# Patient Record
Sex: FEMALE | Race: WHITE | NOT HISPANIC OR LATINO | Employment: OTHER | URBAN - NONMETROPOLITAN AREA
[De-identification: names, ages, dates, MRNs, and addresses within clinical notes are randomized per-mention and may not be internally consistent; named-entity substitution may affect disease eponyms.]

---

## 2017-11-08 ENCOUNTER — TRANSCRIBE ORDERS (OUTPATIENT)
Dept: ADMINISTRATIVE | Facility: HOSPITAL | Age: 57
End: 2017-11-08

## 2017-11-08 DIAGNOSIS — Z12.31 VISIT FOR SCREENING MAMMOGRAM: Primary | ICD-10-CM

## 2017-11-15 ENCOUNTER — OFFICE VISIT (OUTPATIENT)
Dept: CARDIOLOGY | Facility: CLINIC | Age: 57
End: 2017-11-15

## 2017-11-15 VITALS
WEIGHT: 142.4 LBS | BODY MASS INDEX: 24.31 KG/M2 | DIASTOLIC BLOOD PRESSURE: 77 MMHG | HEART RATE: 68 BPM | HEIGHT: 64 IN | SYSTOLIC BLOOD PRESSURE: 151 MMHG

## 2017-11-15 DIAGNOSIS — E78.5 DYSLIPIDEMIA: ICD-10-CM

## 2017-11-15 DIAGNOSIS — R00.2 PALPITATIONS: Primary | ICD-10-CM

## 2017-11-15 DIAGNOSIS — I10 ESSENTIAL HYPERTENSION: ICD-10-CM

## 2017-11-15 PROCEDURE — 90471 IMMUNIZATION ADMIN: CPT | Performed by: INTERNAL MEDICINE

## 2017-11-15 PROCEDURE — 99214 OFFICE O/P EST MOD 30 MIN: CPT | Performed by: INTERNAL MEDICINE

## 2017-11-15 PROCEDURE — 90686 IIV4 VACC NO PRSV 0.5 ML IM: CPT | Performed by: INTERNAL MEDICINE

## 2017-11-15 NOTE — PROGRESS NOTES
Subjective:     Encounter Date:11/15/2017    Patient ID: Greta Wheeler is a 57 y.o.  white female, retired  for [employer], from [place], [place].      REFERRING/PRIMARY CARE PHYSICIAN: Cristian Muñoz MD    Chief Complaint:   Chief Complaint   Patient presents with   • Palpitations     Problem List:  1. Tachypalpitations:   a. Recent symptoms of CCS class I/NYHA class I and palpitations with tachycardia for approximately 1 month.   b. Abnormal EKG demonstrating sinus tachycardia with diffuse nonspecific ST-T changes; ventricular rate of 113 BPM, 2013.   c. Event recorder, 2013-10/17/2013, showing intermittent rapid sinus tachycardia.  d. Transthoracic echocardiogram, 2013, showing an estimated EF of 60% to 65%.   e. Residual class I symptoms.  2. Hypertension.   3. Mild dyslipidemia.   4. Diverticulitis/diverticulosis.   5. Remote tobacco use, quit in .   6. Osteoporosis.   7. Surgical history:  a. Benign lumpectomy, .   b. , .   c. Brain aneurysm repair, .   d. Cholecystectomy, .      Allergies   Allergen Reactions   • Sulfa Antibiotics Hives         Current Outpatient Prescriptions:   •  alendronate (FOSAMAX) 70 MG tablet, 1 (One) Time Per Week., Disp: , Rfl: 3  •  atorvastatin (LIPITOR) 10 MG tablet, Daily., Disp: , Rfl: 3  •  lisinopril-hydrochlorothiazide (PRINZIDE,ZESTORETIC) 10-12.5 MG per tablet, Take  by mouth Daily., Disp: , Rfl:   •  meloxicam (MOBIC) 15 MG tablet, occasional, Disp: , Rfl: 3  •  nebivolol (BYSTOLIC) 2.5 MG tablet, Take 1 tablet by mouth Daily., Disp: 90 tablet, Rfl: 3  •  pantoprazole (PROTONIX) 40 MG EC tablet, Take  by mouth Daily., Disp: , Rfl:     HISTORY OF PRESENT ILLNESS: Patient returns for scheduled annual followup. She denies any palpitations, presyncope, syncope, edema, chest pressure, or shortness of breath.  She just celebrated her birthday and was able to spend the day with her granddaughter,  "which she enjoyed.  She had laboratory studies since she was last here, and she will have her physician send these results to us.  She is requesting a flu vaccination today in office.  She had an episode recently where she had sharp right upper quadrant pain with normal laboratory values.  She states that this sharp pain lasted for around a week and then subsided.  She denies any nausea, vomiting, fevers, chills, or gallbladder ultrasound.  She has already had a cholecystectomy.  She is requesting her next followup appointment be the same day as her 's next fall.  Patient otherwise denies chest pain, shortness of breath, PND, edema, palpitations, syncope or presyncope at this time.        Review of Systems   Respiratory: Positive for cough.       Obtained and otherwise negative except as outlined in problem list and HPI.    Procedures       Objective:       Vitals:    11/15/17 1106 11/15/17 1107   BP: 136/77 151/77   BP Location: Right arm Right arm   Patient Position: Sitting Standing   Pulse: 64 68   Weight: 142 lb 6.4 oz (64.6 kg)    Height: 64\" (162.6 cm)    Recheck blood pressure right arm sitting was 130/70  Body mass index is 24.44 kg/(m^2).   Last weight:  141 lbs.    Physical Exam   Constitutional: She is oriented to person, place, and time. She appears well-developed and well-nourished.   Neck: No JVD present. Carotid bruit is not present. No thyromegaly present.   Cardiovascular: Regular rhythm, S1 normal, S2 normal and normal heart sounds.  Exam reveals no gallop, no S3 and no friction rub.    No murmur heard.  Pulses:       Dorsalis pedis pulses are 2+ on the right side, and 2+ on the left side.        Posterior tibial pulses are 2+ on the right side, and 2+ on the left side.   Pulmonary/Chest: Effort normal and breath sounds normal. She has no wheezes. She has no rhonchi. She has no rales.   Abdominal: Soft. She exhibits no mass. There is no hepatosplenomegaly. There is no tenderness. There is " no guarding.   Bowel sounds audible x4   Musculoskeletal: Normal range of motion. She exhibits no edema.   Lymphadenopathy:     She has no cervical adenopathy.   Neurological: She is alert and oriented to person, place, and time.   Skin: Skin is warm, dry and intact. No rash noted.   Vitals reviewed.        Lab Review: No recent laboratory results available for review today.          Assessment:   Overall continued acceptable course with no interim cardiopulmonary complaints with good functional status. We will defer additional diagnostic or therapeutic intervention from a cardiac perspective at this time. Encouraged patient to have her physician send us her most recent laboratory values.  She received a flu vaccination today in office.       Diagnosis Plan   1. Palpitations  Stable    2. Essential hypertension  Controlled    3. Dyslipidemia  No recent laboratory studies to review           Plan:         1. Patient to continue current medications and close follow up with the above providers.  2. Tentative cardiology follow up in autumn 2018, or patient may return sooner PRN.\  3. Flu vaccination-done       Scribed for Julián Isbell MD by Queenie Wang, APRN. 11/15/2017  11:24 AM    I, Julián Isbell MD, [facility], personally performed the services described in this documentation as scribed by the above named individual in my presence, and it is both accurate and complete. At 11:49 AM on 11/15/2017

## 2017-11-22 RX ORDER — NEBIVOLOL 2.5 MG/1
2.5 TABLET ORAL DAILY
Qty: 90 TABLET | Refills: 3 | Status: SHIPPED | OUTPATIENT
Start: 2017-11-22 | End: 2019-01-02 | Stop reason: SDUPTHER

## 2018-01-17 ENCOUNTER — HOSPITAL ENCOUNTER (OUTPATIENT)
Dept: MAMMOGRAPHY | Facility: HOSPITAL | Age: 58
Discharge: HOME | End: 2018-01-17
Attending: FAMILY MEDICINE

## 2018-02-07 ENCOUNTER — HOSPITAL ENCOUNTER (OUTPATIENT)
Dept: MAMMOGRAPHY | Facility: HOSPITAL | Age: 58
Discharge: HOME | End: 2018-02-07
Attending: FAMILY MEDICINE | Admitting: FAMILY MEDICINE

## 2018-02-07 DIAGNOSIS — Z12.31 VISIT FOR SCREENING MAMMOGRAM: ICD-10-CM

## 2018-02-07 PROCEDURE — 77067 SCR MAMMO BI INCL CAD: CPT

## 2018-02-07 PROCEDURE — 77063 BREAST TOMOSYNTHESIS BI: CPT | Performed by: RADIOLOGY

## 2018-02-07 PROCEDURE — 77067 SCR MAMMO BI INCL CAD: CPT | Performed by: RADIOLOGY

## 2018-02-07 PROCEDURE — 77063 BREAST TOMOSYNTHESIS BI: CPT

## 2018-02-15 ENCOUNTER — TRANSCRIBE ORDERS (OUTPATIENT)
Dept: ADMINISTRATIVE | Facility: HOSPITAL | Age: 58
End: 2018-02-15

## 2018-02-15 DIAGNOSIS — M15.0 PRIMARY GENERALIZED (OSTEO)ARTHRITIS: Primary | ICD-10-CM

## 2018-11-21 ENCOUNTER — OFFICE VISIT (OUTPATIENT)
Dept: CARDIOLOGY | Facility: CLINIC | Age: 58
End: 2018-11-21

## 2018-11-21 VITALS
HEIGHT: 60 IN | WEIGHT: 140.6 LBS | BODY MASS INDEX: 27.61 KG/M2 | HEART RATE: 96 BPM | DIASTOLIC BLOOD PRESSURE: 79 MMHG | SYSTOLIC BLOOD PRESSURE: 105 MMHG

## 2018-11-21 DIAGNOSIS — I10 ESSENTIAL HYPERTENSION: ICD-10-CM

## 2018-11-21 DIAGNOSIS — E78.5 DYSLIPIDEMIA: ICD-10-CM

## 2018-11-21 DIAGNOSIS — R00.2 PALPITATIONS: Primary | ICD-10-CM

## 2018-11-21 PROCEDURE — 90471 IMMUNIZATION ADMIN: CPT | Performed by: INTERNAL MEDICINE

## 2018-11-21 PROCEDURE — 99213 OFFICE O/P EST LOW 20 MIN: CPT | Performed by: INTERNAL MEDICINE

## 2018-11-21 PROCEDURE — 90674 CCIIV4 VAC NO PRSV 0.5 ML IM: CPT | Performed by: INTERNAL MEDICINE

## 2018-11-21 NOTE — PROGRESS NOTES
Subjective:     Encounter Date:2018    Patient ID: Greta Wheeler is a 58 y.o.  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      REFERRING/PHYSICIAN: Cristian Muñoz MD    Chief Complaint:   Chief Complaint   Patient presents with   • Palpitations     Problem List:  1. Tachypalpitations:   a. Recent symptoms of CCS class I/NYHA class I and palpitations with tachycardia for approximately 1 month.   b. Abnormal EKG demonstrating sinus tachycardia with diffuse nonspecific ST-T changes; ventricular rate of 113 BPM, 2013.   c. Event recorder, 2013-10/17/2013, showing intermittent rapid sinus tachycardia.  d. Transthoracic echocardiogram, 2013, showing an estimated EF of 60% to 65%.   e. Residual class I symptoms.  2. Hypertension.   3. Mild dyslipidemia.   4. Diverticulitis/diverticulosis.   5. Remote tobacco use, quit in .   6. Osteoporosis.   7. Surgical history:  a. Benign lumpectomy, .   b. , .   c. Brain aneurysm repair, .   d. Cholecystectomy, .        Allergies   Allergen Reactions   • Sulfa Antibiotics Hives         Current Outpatient Medications:   •  alendronate (FOSAMAX) 70 MG tablet, 1 (One) Time Per Week., Disp: , Rfl: 3  •  atorvastatin (LIPITOR) 10 MG tablet, Daily., Disp: , Rfl: 3  •  lisinopril-hydrochlorothiazide (PRINZIDE,ZESTORETIC) 10-12.5 MG per tablet, Take  by mouth Daily., Disp: , Rfl:   •  meloxicam (MOBIC) 15 MG tablet, occasional, Disp: , Rfl: 3  •  nebivolol (BYSTOLIC) 2.5 MG tablet, Take 1 tablet by mouth Daily., Disp: 90 tablet, Rfl: 3  •  pantoprazole (PROTONIX) 40 MG EC tablet, Take  by mouth Daily., Disp: , Rfl:     HISTORY OF PRESENT ILLNESS: Patient returns for scheduled annual followup.  She has done well since last being seen in our office.  She has done well on her  "Bystolic.  She has a 4-year-old granddaughter named Tuna, and she watches her 2-3 days a week and also works for a  3 days a week.  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  She stays active and has no symptoms from a cardiopulmonary perspective with her activities.  She says they are planning a full Thanksgiving with her 's side of the family and will probably have about 50 people at her mother-in-law's house.  She will have followup with Dr. Muñoz in the next few months and will ask that we be provided any laboratory results that he has done.  She is interested in having her influenza immunization today.  She does not have any swelling or discomfort in her feet.  Patient otherwise denies chest pain, shortness of breath, PND, edema, palpitations, syncope or presyncope at this time.    ROS   Obtained and otherwise negative except as outlined in problem list and HPI.    Procedures       Objective:       Vitals:    11/21/18 1040 11/21/18 1042   BP: 117/77 105/79   BP Location: Right arm Right arm   Patient Position: Sitting Standing   Pulse: 86 96   Weight: 63.8 kg (140 lb 9.6 oz)    Height: 152.4 cm (60\")      Body mass index is 27.46 kg/m².   Last weight:  142 lbs.    Physical Exam   Constitutional: She is oriented to person, place, and time. She appears well-developed and well-nourished.   Neck: No JVD present. Carotid bruit is not present. No thyromegaly present.   Cardiovascular: Regular rhythm, S1 normal, S2 normal and normal heart sounds. Exam reveals no gallop, no S3 and no friction rub.   No murmur heard.  Pulses:       Dorsalis pedis pulses are 2+ on the right side, and 2+ on the left side.        Posterior tibial pulses are 2+ on the right side, and 2+ on the left side.   Pulmonary/Chest: Effort normal and breath sounds normal. She has no wheezes. She has no rhonchi. She has no rales.   Abdominal: Soft. She exhibits no mass. There is no " hepatosplenomegaly. There is no tenderness. There is no guarding.   Bowel sounds audible x4   Musculoskeletal: Normal range of motion. She exhibits no edema.   Lymphadenopathy:     She has no cervical adenopathy.   Neurological: She is alert and oriented to person, place, and time.   Skin: Skin is warm, dry and intact. No rash noted.   Vitals reviewed.        Lab Review: No recent laboratory results available for review today        Assessment:   Overall continued acceptable course with no interim cardiopulmonary complaints with good functional status. We will defer additional diagnostic or therapeutic intervention from a cardiac perspective at this time.  Hopefully, we will be allowed to review any upcoming laboratory results with her by letter.       Diagnosis Plan   1. Palpitations  No recurrent angina pectoris or CHF; continue current treatment     2. Essential hypertension  Acceptable control   3. Dyslipidemia  No recent data to review          Plan:         1. Patient to continue current medications and close follow up with the above providers.  2. Influenza immunization is administered today via left deltoid without complication.  3. Tentative cardiology follow up in November 2019, or patient may return sooner PRN.    Transcribed by Jennifer Lamas for Dr. Julián Isbell at 10:48 AM on 11/21/2018    IJulián MD, [facility], personally performed the services described in this documentation as scribed by the above named individual in my presence, and it is both accurate and complete. At 11:28 AM on 11/21/2018

## 2019-01-02 RX ORDER — NEBIVOLOL HYDROCHLORIDE 2.5 MG/1
TABLET ORAL
Qty: 90 TABLET | Refills: 3 | Status: SHIPPED | OUTPATIENT
Start: 2019-01-02 | End: 2019-12-04 | Stop reason: SDUPTHER

## 2019-01-09 RX ORDER — ATORVASTATIN CALCIUM 20 MG/1
20 TABLET, FILM COATED ORAL DAILY
Qty: 30 TABLET | Refills: 5 | Status: SHIPPED | OUTPATIENT
Start: 2019-01-09 | End: 2020-12-09

## 2019-02-08 ENCOUNTER — HOSPITAL ENCOUNTER (OUTPATIENT)
Dept: MAMMOGRAPHY | Facility: HOSPITAL | Age: 59
Discharge: HOME | End: 2019-02-08

## 2019-02-08 ENCOUNTER — HOSPITAL ENCOUNTER (OUTPATIENT)
Dept: BONE DENSITY | Facility: HOSPITAL | Age: 59
Discharge: HOME | End: 2019-02-08
Admitting: FAMILY MEDICINE

## 2019-02-08 DIAGNOSIS — M81.0 OSTEOPOROSIS, POST-MENOPAUSAL: ICD-10-CM

## 2019-02-08 DIAGNOSIS — Z12.39 SCREENING BREAST EXAMINATION: ICD-10-CM

## 2019-02-08 PROCEDURE — 77080 DXA BONE DENSITY AXIAL: CPT | Performed by: RADIOLOGY

## 2019-02-08 PROCEDURE — 77063 BREAST TOMOSYNTHESIS BI: CPT | Performed by: RADIOLOGY

## 2019-02-08 PROCEDURE — 77080 DXA BONE DENSITY AXIAL: CPT

## 2019-02-08 PROCEDURE — 77063 BREAST TOMOSYNTHESIS BI: CPT

## 2019-02-08 PROCEDURE — 77067 SCR MAMMO BI INCL CAD: CPT

## 2019-02-08 PROCEDURE — 77067 SCR MAMMO BI INCL CAD: CPT | Performed by: RADIOLOGY

## 2019-12-03 NOTE — PROGRESS NOTES
Subjective:     Encounter Date:2019    Patient ID: Greta Wheeler is a 59 y.o.  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      REFERRING/PHYSICIAN: Cristian Muñoz MD    Chief Complaint:   Chief Complaint   Patient presents with   • Palpitations     Problem List:  1. Tachypalpitations:  a. Symptoms of CCS class I/NYHA class I and palpitations with tachycardia for approximately 1 month.   b. Abnormal EKG demonstrating sinus tachycardia with diffuse nonspecific ST-T changes; ventricular rate of 113 BPM, 2013.   c. Event recorder, 2013-10/17/2013, showing intermittent rapid sinus tachycardia.  d. Transthoracic echocardiogram, 2013, showing an estimated EF of 60% to 65%.   e. Residual class I symptoms.  2. Hypertension.   3. Mild dyslipidemia.   4. Diverticulitis/diverticulosis.   5. Remote tobacco use, quit in .   6. Osteoporosis, now diagnosed as osteopenia, 2019   7. Surgical history:  a. Benign lumpectomy, .   b. , .   c. Brain aneurysm repair, .   d. Cholecystectomy, .     Allergies   Allergen Reactions   • Sulfa Antibiotics Hives       Current Outpatient Medications:   •  atorvastatin (LIPITOR) 20 MG tablet, Take 1 tablet by mouth Daily. (Patient taking differently: Take 10 mg by mouth Daily.), Disp: 30 tablet, Rfl: 5  •  BYSTOLIC 2.5 MG tablet, take 1 tablet by mouth once daily, Disp: 90 tablet, Rfl: 3  •  lisinopril-hydrochlorothiazide (PRINZIDE,ZESTORETIC) 10-12.5 MG per tablet, Take  by mouth Daily., Disp: , Rfl:   •  meloxicam (MOBIC) 15 MG tablet, Daily., Disp: , Rfl: 3  •  pantoprazole (PROTONIX) 40 MG EC tablet, Take  by mouth Daily., Disp: , Rfl:      History of Present Illness: Patient returns for scheduled annual followup.  She denies any chest pain, shortness of breath,  "dizziness, presyncope, or syncope.  Since she has been on Bystolic, she has not noticed very many palpitations, and what she does feel, she attributes to anxiety.  She states that she is always on the go and tries to do some walking.  She is often taking care of her grandchild who keeps her busy.  She is requesting an influenza vaccination in office today.  She is scheduled to have yearly laboratory testing with her physician in January 2020 and will share the results when available.  She has had no hospitalizations, surgeries, or new diagnoses since she was last seen.  She is asymptomatic and very active.  Patient otherwise denies chest pain, shortness of breath, PND, edema, sustained palpitations, syncope or presyncope at this time.      Review of Systems   All other systems reviewed and are negative.     Obtained and negative except as outlined in problem list and HPI.    Procedures       Objective:       Vitals:    12/04/19 1104 12/04/19 1105   BP: 127/92 108/84   BP Location: Right arm Right arm   Patient Position: Sitting Standing   Pulse: 99 108   Weight: 65.8 kg (145 lb) 65.8 kg (145 lb)   Height: 165.1 cm (65\") 165.1 cm (65\")     Body mass index is 24.13 kg/m².   Last weight:  140 lbs.    Physical Exam   Constitutional: She is oriented to person, place, and time. She appears well-developed and well-nourished.   Neck: No JVD present. Carotid bruit is not present. No thyromegaly present.   Cardiovascular: Regular rhythm, S1 normal, S2 normal and normal heart sounds. Exam reveals no gallop, no S3 and no friction rub.   No murmur heard.  Pulses:       Dorsalis pedis pulses are 2+ on the right side, and 2+ on the left side.        Posterior tibial pulses are 2+ on the right side, and 2+ on the left side.   Pulmonary/Chest: Effort normal and breath sounds normal. She has no wheezes. She has no rhonchi. She has no rales.   Abdominal: Soft. She exhibits no mass. There is no hepatosplenomegaly. There is no " tenderness. There is no guarding.   Bowel sounds audible x4   Musculoskeletal: Normal range of motion. She exhibits no edema.   Lymphadenopathy:     She has no cervical adenopathy.   Neurological: She is alert and oriented to person, place, and time.   Skin: Skin is warm, dry and intact. No rash noted.   Vitals reviewed.        Lab Review: 12/26/2018 (reviewed with patient by letter):  · CBC - hematocrit 37%, hemoglobin 12.4, WBC 7700, acceptable indices, platelet count, and differential  · CMP - normal electrolytes, serum creatinine 0.8, BUN 15, glucose 95 mg/dL, normal serum calcium and liver function tests, serum albumin 4.2  · FLP - total cholesterol 176, triglycerides 161, HDL-C 43, LDL cholesterol 100           Assessment:       Overall continued acceptable course with no new interim cardiopulmonary complaints with good functional status. We will defer additional diagnostic or therapeutic intervention from a cardiac perspective at this time.  An influenza vaccination was given in office today.     Diagnosis Plan   1. Palpitations   Stable on Bystolic with no recurrent persistent palpitations   2. Essential hypertension   Controlled, continue lisinopril/HCTZ, Bystolic   3. Dyslipidemia   No new lipid panel to review, patient will send her next laboratory results to be drawn in January 2020, continue atorvastatin          Plan:         1. Patient to continue current medications and close follow up with the above providers.  2. Tentative cardiology follow up in December 2020, or patient may return sooner PRN.  3. Influenza vaccination given in office today in left deltoid without complication    Scribed for Julián Isbell MD by Queenie Wang, APRN. 12/4/2019  12:43 PM    I, Julián Isbell MD, [facility], personally performed the services described in this documentation as scribed by the above named individual in my presence, and it is both accurate and complete. At 12:44 PM on 12/04/2019

## 2019-12-04 ENCOUNTER — OFFICE VISIT (OUTPATIENT)
Dept: CARDIOLOGY | Facility: CLINIC | Age: 59
End: 2019-12-04

## 2019-12-04 VITALS
HEIGHT: 65 IN | WEIGHT: 145 LBS | BODY MASS INDEX: 24.16 KG/M2 | SYSTOLIC BLOOD PRESSURE: 108 MMHG | DIASTOLIC BLOOD PRESSURE: 84 MMHG | HEART RATE: 108 BPM

## 2019-12-04 DIAGNOSIS — R00.2 PALPITATIONS: Primary | ICD-10-CM

## 2019-12-04 DIAGNOSIS — I10 ESSENTIAL HYPERTENSION: ICD-10-CM

## 2019-12-04 DIAGNOSIS — E78.5 DYSLIPIDEMIA: ICD-10-CM

## 2019-12-04 PROCEDURE — 99214 OFFICE O/P EST MOD 30 MIN: CPT | Performed by: INTERNAL MEDICINE

## 2019-12-04 PROCEDURE — 90471 IMMUNIZATION ADMIN: CPT | Performed by: INTERNAL MEDICINE

## 2019-12-04 PROCEDURE — 90674 CCIIV4 VAC NO PRSV 0.5 ML IM: CPT | Performed by: INTERNAL MEDICINE

## 2019-12-04 RX ORDER — NEBIVOLOL 2.5 MG/1
2.5 TABLET ORAL DAILY
Qty: 90 TABLET | Refills: 3 | Status: SHIPPED | OUTPATIENT
Start: 2019-12-04 | End: 2020-12-09 | Stop reason: SDUPTHER

## 2020-05-26 ENCOUNTER — APPOINTMENT (OUTPATIENT)
Dept: MAMMOGRAPHY | Facility: HOSPITAL | Age: 60
End: 2020-05-26

## 2020-11-13 ENCOUNTER — TRANSCRIBE ORDERS (OUTPATIENT)
Dept: ADMINISTRATIVE | Facility: HOSPITAL | Age: 60
End: 2020-11-13

## 2020-11-13 DIAGNOSIS — Z11.59 ENCOUNTER FOR SCREENING FOR OTHER VIRAL DISEASES: Primary | ICD-10-CM

## 2020-11-16 ENCOUNTER — LAB (OUTPATIENT)
Dept: LAB | Facility: HOSPITAL | Age: 60
End: 2020-11-16

## 2020-11-16 DIAGNOSIS — Z11.59 ENCOUNTER FOR SCREENING FOR OTHER VIRAL DISEASES: ICD-10-CM

## 2020-11-16 PROCEDURE — U0004 COV-19 TEST NON-CDC HGH THRU: HCPCS | Performed by: INTERNAL MEDICINE

## 2020-11-16 PROCEDURE — C9803 HOPD COVID-19 SPEC COLLECT: HCPCS

## 2020-11-17 LAB — SARS-COV-2 RNA RESP QL NAA+PROBE: NOT DETECTED

## 2020-12-04 NOTE — PROGRESS NOTES
Subjective:     Encounter Date:2020    Patient ID: Greta Wheeler is a 60 y.o.  white female, retired  for 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      REFERRING/PHYSICIAN: Critsian Muñoz MD    Chief Complaint:   Chief Complaint   Patient presents with   • Palpitations       Problem List:  1. Tachypalpitations:  a. Symptoms of CCS class I/NYHA class I and palpitations with tachycardia for approximately 1 month.   b. Abnormal EKG demonstrating sinus tachycardia with diffuse nonspecific ST-T changes; ventricular rate of 113 BPM, 2013.   c. Event recorder, 2013-10/17/2013, showing intermittent rapid sinus tachycardia.  d. Transthoracic echocardiogram, 2013, showing an estimated EF of 60% to 65%.   e. Residual class I symptoms.  2. Hypertension.   3. Mild dyslipidemia.   4. Diverticulitis/diverticulosis.   5. Remote tobacco use, quit in .   6. Osteoporosis, now diagnosed as osteopenia, 2019   7. Surgical history:  a. Benign lumpectomy, .   b. , .   c. Brain aneurysm repair, .   d. Cholecystectomy, .    Allergies   Allergen Reactions   • Sulfa Antibiotics Hives       Current Outpatient Medications:   •  atorvastatin (LIPITOR) 10 MG tablet, Take 10 mg by mouth Daily., Disp: , Rfl:   •  Calcium Carb-Cholecalciferol (CALCIUM 500+D PO), Take  by mouth Daily., Disp: , Rfl:   •  dexlansoprazole (Dexilant) 60 MG capsule, Take 60 mg by mouth Daily., Disp: , Rfl:   •  fluticasone (FLONASE) 50 MCG/ACT nasal spray, 2 sprays into the nostril(s) as directed by provider Daily As Needed for Rhinitis., Disp: , Rfl:   •  levocetirizine (XYZAL) 5 MG tablet, Take 5 mg by mouth Daily As Needed for Allergies., Disp: , Rfl:   •  lisinopril-hydrochlorothiazide (PRINZIDE,ZESTORETIC) 10-12.5 MG per tablet, Take  by mouth Daily., Disp: ,  "Rfl:   •  meloxicam (MOBIC) 15 MG tablet, Daily., Disp: , Rfl: 3  •  multivitamin with minerals (ICAPS AREDS FORMULA PO), Take 1 tablet by mouth Daily., Disp: , Rfl:   •  nebivolol (BYSTOLIC) 2.5 MG tablet, Take 1 tablet by mouth Daily., Disp: 90 tablet, Rfl: 3    History of Present Illness: Patient returns for scheduled annual follow up. Since last visit, patient has been doing well overall and has been sheltering in place. 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 Her laboratory studies from January 2020 are reviewed with her. She was recommended to alter atorvastatin to rosuvastatin via letter but patient decided to add krill oil to her regimen instead. She has had no interim ER visits, hospitalizations, serious illnesses, or surgeries. She is sleeping well at night. She is active daily and has no cardiopulmonary complaints with exertion. Patient otherwise denies chest pain, shortness of breath, PND, edema, palpitations, syncope, or presyncope at this time.       ROS   Obtained and negative except as outlined in problem list and HPI.    Procedures       Objective:       Vitals:    12/09/20 1015 12/09/20 1017   BP: 118/80 124/76   BP Location: Right arm Right arm   Patient Position: Sitting Standing   Cuff Size: Adult Adult   Pulse: 86 86   Temp: 95.3 °F (35.2 °C)    SpO2: 99%    Weight: 67.1 kg (148 lb)    Height: 165.1 cm (65\")      Body mass index is 24.63 kg/m².  Last weight: 145 lbs    Vitals signs reviewed.   Constitutional:       Appearance: Well-developed.   Neck:      Thyroid: No thyromegaly.      Vascular: No carotid bruit or JVD.      Lymphadenopathy: No cervical adenopathy.   Pulmonary:      Effort: Pulmonary effort is normal.      Breath sounds: Normal breath sounds. No wheezing. No rhonchi. No rales.   Cardiovascular:      Regular rhythm.      No gallop. No S3 gallop.   Pulses:     Dorsalis pedis: 2+ bilaterally.     Posterior tibial: 2+ bilaterally.  Edema:     Peripheral edema " absent.   Abdominal:      Palpations: Abdomen is soft. There is no abdominal mass.      Tenderness: There is no abdominal tenderness. There is no guarding.   Musculoskeletal: Normal range of motion.   Skin:     General: Skin is warm and dry.      Findings: No rash.   Neurological:      Mental Status: Alert and oriented to person, place, and time.           Lab Review:     01/09/2020 (reviewed with patient by letter- recommended to alter atorvastatin to 20 mg daily or to rosuvastatin 20 mg daily):  • CBC: hematocrit 40, hemoglobin 13, WBC 7100 and normal indices, platelet count and differential  • CMP: normal electrolytes with acceptable kidney function, serum creatinine 1.1, BUN 20, glucose 97 and normal serum calcium and liver function tests.   • Serum albumin- 4.2  • FLP: total cholesterol 200, triglycerides 217, HDL-C 42, LDL-C 115  • TSH- normal           Assessment:       Overall continued acceptable course with no new interim cardiopulmonary complaints with good functional status. We will defer additional diagnostic or therapeutic intervention from a cardiac perspective at this time.     Diagnosis Plan   1. Palpitations  No persistent palpitations; continue Bystolic   2. Essential hypertension  Acceptable control; Continue current treatment.    3. Dyslipidemia  Supoptimal FLP, January 2020; Continue current treatment          Plan:         1. Patient to continue current medications and close follow up with the above providers.  2. Tentative cardiology follow up in December 2021 or patient may return sooner PRN.    Scribed for Julián Isbell MD by Melissa Smith. 12/9/2020  10:28 EST     Julián YU MD, [facility], personally performed the services described in this documentation as scribed by the above named individual in my presence, and it is both accurate and complete. At 10:29 EST on 12/09/2020

## 2020-12-09 ENCOUNTER — OFFICE VISIT (OUTPATIENT)
Dept: CARDIOLOGY | Facility: CLINIC | Age: 60
End: 2020-12-09

## 2020-12-09 VITALS
OXYGEN SATURATION: 99 % | SYSTOLIC BLOOD PRESSURE: 124 MMHG | WEIGHT: 148 LBS | BODY MASS INDEX: 24.66 KG/M2 | DIASTOLIC BLOOD PRESSURE: 76 MMHG | HEART RATE: 86 BPM | HEIGHT: 65 IN | TEMPERATURE: 95.3 F

## 2020-12-09 DIAGNOSIS — I10 ESSENTIAL HYPERTENSION: ICD-10-CM

## 2020-12-09 DIAGNOSIS — E78.5 DYSLIPIDEMIA: ICD-10-CM

## 2020-12-09 DIAGNOSIS — R00.2 PALPITATIONS: Primary | ICD-10-CM

## 2020-12-09 PROCEDURE — 99214 OFFICE O/P EST MOD 30 MIN: CPT | Performed by: INTERNAL MEDICINE

## 2020-12-09 RX ORDER — DEXLANSOPRAZOLE 60 MG/1
60 CAPSULE, DELAYED RELEASE ORAL DAILY
COMMUNITY

## 2020-12-09 RX ORDER — NEBIVOLOL 2.5 MG/1
2.5 TABLET ORAL DAILY
Qty: 90 TABLET | Refills: 3 | Status: SHIPPED | OUTPATIENT
Start: 2020-12-09 | End: 2022-01-03 | Stop reason: SDUPTHER

## 2020-12-09 RX ORDER — FLUTICASONE PROPIONATE 50 MCG
2 SPRAY, SUSPENSION (ML) NASAL DAILY PRN
COMMUNITY

## 2020-12-09 RX ORDER — LEVOCETIRIZINE DIHYDROCHLORIDE 5 MG/1
5 TABLET, FILM COATED ORAL DAILY PRN
COMMUNITY

## 2020-12-09 RX ORDER — ATORVASTATIN CALCIUM 10 MG/1
10 TABLET, FILM COATED ORAL DAILY
COMMUNITY

## 2020-12-09 RX ORDER — MULTIPLE VITAMINS W/ MINERALS TAB 9MG-400MCG
1 TAB ORAL DAILY
COMMUNITY

## 2021-02-25 DIAGNOSIS — Z23 IMMUNIZATION DUE: ICD-10-CM

## 2022-01-03 RX ORDER — NEBIVOLOL 2.5 MG/1
2.5 TABLET ORAL DAILY
Qty: 90 TABLET | Refills: 1 | Status: SHIPPED | OUTPATIENT
Start: 2022-01-03

## 2022-08-31 ENCOUNTER — TRANSCRIBE ORDERS (OUTPATIENT)
Dept: ADMINISTRATIVE | Facility: HOSPITAL | Age: 62
End: 2022-08-31

## 2022-08-31 DIAGNOSIS — Z12.31 VISIT FOR SCREENING MAMMOGRAM: Primary | ICD-10-CM

## 2022-10-14 ENCOUNTER — HOSPITAL ENCOUNTER (OUTPATIENT)
Dept: BONE DENSITY | Facility: HOSPITAL | Age: 62
Discharge: HOME | End: 2022-10-14

## 2022-10-14 ENCOUNTER — HOSPITAL ENCOUNTER (OUTPATIENT)
Dept: MAMMOGRAPHY | Facility: HOSPITAL | Age: 62
Discharge: HOME | End: 2022-10-14

## 2022-10-14 DIAGNOSIS — Z78.0 ASYMPTOMATIC AGE-RELATED POSTMENOPAUSAL STATE: ICD-10-CM

## 2022-10-14 DIAGNOSIS — Z12.31 VISIT FOR SCREENING MAMMOGRAM: ICD-10-CM

## 2022-10-14 PROCEDURE — 77067 SCR MAMMO BI INCL CAD: CPT

## 2022-10-14 PROCEDURE — 77063 BREAST TOMOSYNTHESIS BI: CPT

## 2022-10-14 PROCEDURE — 77080 DXA BONE DENSITY AXIAL: CPT | Performed by: RADIOLOGY

## 2022-10-14 PROCEDURE — 77063 BREAST TOMOSYNTHESIS BI: CPT | Performed by: RADIOLOGY

## 2022-10-14 PROCEDURE — 77080 DXA BONE DENSITY AXIAL: CPT

## 2022-10-14 PROCEDURE — 77067 SCR MAMMO BI INCL CAD: CPT | Performed by: RADIOLOGY

## 2024-05-24 ENCOUNTER — HOSPITAL ENCOUNTER (OUTPATIENT)
Dept: GENERAL RADIOLOGY | Facility: HOSPITAL | Age: 64
Discharge: HOME | End: 2024-05-24
Payer: COMMERCIAL

## 2024-05-24 ENCOUNTER — TRANSCRIBE ORDERS (OUTPATIENT)
Dept: LAB | Facility: HOSPITAL | Age: 64
End: 2024-05-24
Payer: COMMERCIAL

## 2024-05-24 DIAGNOSIS — M79.671 PAIN IN RIGHT FOOT: ICD-10-CM

## 2024-05-24 DIAGNOSIS — M79.672 PAIN IN LEFT FOOT: ICD-10-CM

## 2024-05-24 DIAGNOSIS — M79.672 PAIN IN LEFT FOOT: Primary | ICD-10-CM

## 2024-05-24 PROCEDURE — 73630 X-RAY EXAM OF FOOT: CPT
